# Patient Record
Sex: FEMALE | Race: WHITE | Employment: FULL TIME | URBAN - METROPOLITAN AREA
[De-identification: names, ages, dates, MRNs, and addresses within clinical notes are randomized per-mention and may not be internally consistent; named-entity substitution may affect disease eponyms.]

---

## 2018-08-07 ENCOUNTER — TELEPHONE (OUTPATIENT)
Dept: OBGYN CLINIC | Facility: CLINIC | Age: 43
End: 2018-08-07

## 2018-08-07 ENCOUNTER — OFFICE VISIT (OUTPATIENT)
Dept: OBGYN CLINIC | Facility: CLINIC | Age: 43
End: 2018-08-07
Payer: COMMERCIAL

## 2018-08-07 ENCOUNTER — APPOINTMENT (OUTPATIENT)
Dept: RADIOLOGY | Facility: CLINIC | Age: 43
End: 2018-08-07
Payer: COMMERCIAL

## 2018-08-07 VITALS — WEIGHT: 142 LBS | BODY MASS INDEX: 25.16 KG/M2 | HEIGHT: 63 IN

## 2018-08-07 DIAGNOSIS — M25.561 RIGHT KNEE PAIN, UNSPECIFIED CHRONICITY: Primary | ICD-10-CM

## 2018-08-07 DIAGNOSIS — M25.561 RIGHT KNEE PAIN, UNSPECIFIED CHRONICITY: ICD-10-CM

## 2018-08-07 PROCEDURE — 73562 X-RAY EXAM OF KNEE 3: CPT

## 2018-08-07 PROCEDURE — 99204 OFFICE O/P NEW MOD 45 MIN: CPT | Performed by: ORTHOPAEDIC SURGERY

## 2018-08-07 RX ORDER — NAPROXEN 500 MG/1
500 TABLET ORAL 2 TIMES DAILY WITH MEALS
Qty: 30 TABLET | Refills: 1 | Status: SHIPPED | OUTPATIENT
Start: 2018-08-07 | End: 2018-08-21 | Stop reason: HOSPADM

## 2018-08-07 RX ORDER — ACETAMINOPHEN 325 MG/1
650 TABLET ORAL EVERY 6 HOURS PRN
COMMUNITY

## 2018-08-07 RX ORDER — NAPROXEN AND ESOMEPRAZOLE MAGNESIUM 500; 20 MG/1; MG/1
TABLET, DELAYED RELEASE ORAL AS NEEDED
COMMUNITY

## 2018-08-07 RX ORDER — IBUPROFEN 200 MG
TABLET ORAL EVERY 6 HOURS PRN
COMMUNITY

## 2018-08-07 NOTE — LETTER
August 7, 2018     Patient: Sarah Huerta   YOB: 1975   Date of Visit: 8/7/2018       To Whom it May Concern:    Sarah Huerta is under my professional care  She was seen in my office on 8/7/2018  She should not return to work until 8/13/18 pending the results of her MRI  If you have any questions or concerns, please don't hesitate to call           Sincerely,          Raúl Hurtado MD        CC: No Recipients

## 2018-08-07 NOTE — PROGRESS NOTES
Assessment/Plan:  1  Right knee pain, unspecified chronicity  XR knee 3 vw right non injury    MRI knee right  wo contrast    naproxen (EC NAPROSYN) 500 MG EC tablet       Scribe Attestation    I,:   Mounika Mcnamara am acting as a scribe while in the presence of the attending physician :        I,:   Kristine Rodríguez MD personally performed the services described in this documentation    as scribed in my presence :          I am familiar with Tracy as I previously treated her while practicing in [place] in 2013  She continues to suffer from patellofemoral degenerative changes  I am concerned as her clinical presentation today is not consistent with her previous diagnoses  I reviewed the x-rays of her right knee with her in the office today and explained that her x-ray is consistent with the previously diagnosed patellofemoral degenerative changes  Her x-rays do demonstrate appropriate alignment of the medial compartment  I do not appreciate any acute fractures  At this time, I would like to obtain an MRI of the right knee to question meniscal injury  We will also be able to continue to monitor the arthritic changes in the central compartment of the knee as well  I have provided a prescription for this today in the office  I also provided Tracy with a prescription for naproxen to address the swelling in her knee and to decrease her pain  She will follow up with me after her MRI to review the results  Subjective:   Bernie Spears is a 41 y o  female who presents today for evaluation of right knee pain  She states she began having pain in the knee after a fall in approximately 2010  She has undergone multiple rounds of MRI in 2011, 2013, and 2015 and diagnosed with advancing patellofemoral degenerative changes  Those reports are available for our review today  Tracy reports that she has had success in controlling her symptoms with physical therapy and home exercises   She states that approximately 3 weeks ago she started experiencing an increase in her symptoms  At today's appointment, she complains of constant, moderate to severe, nonradiating aching pain about the medial aspect of the knee  She describes this pain as different from the central compartment pain she is familiar with related to her patellofemoral arthritis  Her symptoms are worse when she drives long distances for work and going up and down stairs  She has been taking over the counter medications including advil and tylenol with only short periods of very mild relief  She denies any recent injury or trauma  She denies any paresthesia  Review of Systems   Constitutional: Negative for chills, fever and unexpected weight change  HENT: Negative for hearing loss, nosebleeds and sore throat  Eyes: Negative for pain, redness and visual disturbance  Respiratory: Negative for cough, shortness of breath and wheezing  Cardiovascular: Negative for chest pain, palpitations and leg swelling  Gastrointestinal: Negative for abdominal pain, nausea and vomiting  Endocrine: Negative for polydipsia and polyuria  Genitourinary: Negative for dysuria and hematuria  Musculoskeletal: Positive for arthralgias, joint swelling and myalgias  See HPI   Skin: Negative for rash and wound  Neurological: Negative for dizziness, numbness and headaches  Psychiatric/Behavioral: Negative for decreased concentration and suicidal ideas  The patient is not nervous/anxious  No past medical history on file  Past Surgical History:   Procedure Laterality Date     SECTION         Family History   Problem Relation Age of Onset   Den Perez Cancer Mother     Cancer Sister        Social History     Occupational History    Not on file       Social History Main Topics    Smoking status: Never Smoker    Smokeless tobacco: Never Used    Alcohol use No    Drug use: No    Sexual activity: Not on file         Current Outpatient Prescriptions:    acetaminophen (TYLENOL) 325 mg tablet, Take 650 mg by mouth every 6 (six) hours as needed for mild pain, Disp: , Rfl:     Glucosamine-Chondroitin (COSAMIN DS PO), Take by mouth, Disp: , Rfl:     ibuprofen (MOTRIN) 200 mg tablet, Take by mouth every 6 (six) hours as needed for mild pain, Disp: , Rfl:     Naproxen-Esomeprazole (VIMOVO) 500-20 MG TBEC, Take by mouth as needed, Disp: , Rfl:     naproxen (EC NAPROSYN) 500 MG EC tablet, Take 1 tablet (500 mg total) by mouth 2 (two) times a day with meals, Disp: 30 tablet, Rfl: 1    No Known Allergies    Objective: There were no vitals filed for this visit  Right Knee Exam     Tenderness   The patient is experiencing tenderness in the medial joint line  Range of Motion   Extension: -10   Flexion: 130     Tests   Varus: negative  Valgus: negative    Other   Erythema: present  Scars: absent  Sensation: normal  Pulse: present  Swelling: moderate    Comments:  Crepitus with flexion and extension            Physical Exam   Constitutional: She is oriented to person, place, and time  She appears well-developed and well-nourished  HENT:   Head: Normocephalic and atraumatic  Eyes: Conjunctivae are normal    Neck: Neck supple  Cardiovascular: Intact distal pulses  Pulmonary/Chest: Effort normal    Neurological: She is alert and oriented to person, place, and time  Skin: Skin is warm and dry  Psychiatric: She has a normal mood and affect  Her behavior is normal    Vitals reviewed  I have personally reviewed pertinent films in PACS and my interpretation is as follows:  X-rays of the knees obtained on 8/7/18 show advanced degenerative changes to the patellofemoral compartment  This is consistent with MRI reports from 2011, 2013, and 2015  Appropriate alignment of the medial compartment is noted  I do not appreciate any acute fractures

## 2018-08-07 NOTE — PROGRESS NOTES
Assessment/Plan:  No diagnosis found  Scribe Attestation    I,:    am acting as a scribe while in the presence of the attending physician :        I,:    personally performed the services described in this documentation    as scribed in my presence :              ***    Subjective:   Lacey Limon is a 41 y o  female who presents ***    No issues until 3 weeks ago  Symptoms have gotten progressively worse  Most notable when driving  Moderate relief from OTC meds but not lasting  Physical therapy 3-4 years ago which helped  Has been doing HEP  Review of Systems      No past medical history on file  Past Surgical History:   Procedure Laterality Date     SECTION         Family History   Problem Relation Age of Onset   [facility] Cancer Mother     Cancer Sister        Social History     Occupational History    Not on file  Social History Main Topics    Smoking status: Never Smoker    Smokeless tobacco: Never Used    Alcohol use No    Drug use: No    Sexual activity: Not on file         Current Outpatient Prescriptions:     acetaminophen (TYLENOL) 325 mg tablet, Take 650 mg by mouth every 6 (six) hours as needed for mild pain, Disp: , Rfl:     Glucosamine-Chondroitin (COSAMIN DS PO), Take by mouth, Disp: , Rfl:     ibuprofen (MOTRIN) 200 mg tablet, Take by mouth every 6 (six) hours as needed for mild pain, Disp: , Rfl:     Naproxen-Esomeprazole (VIMOVO) 500-20 MG TBEC, Take by mouth as needed, Disp: , Rfl:     No Known Allergies    Objective: There were no vitals filed for this visit  Right Knee Exam     Tenderness   The patient is experiencing tenderness in the medial joint line      Range of Motion   Extension: -10   Flexion: 130     Other   Erythema: absent  Scars: absent  Sensation: normal  Pulse: present  Swelling: moderate            Physical Exam    I have personally reviewed pertinent films in PACS and my interpretation is as follows:  ***

## 2018-08-10 ENCOUNTER — TELEPHONE (OUTPATIENT)
Dept: OBGYN CLINIC | Facility: HOSPITAL | Age: 43
End: 2018-08-10

## 2018-08-10 ENCOUNTER — TELEPHONE (OUTPATIENT)
Dept: OBGYN CLINIC | Facility: CLINIC | Age: 43
End: 2018-08-10

## 2018-08-10 DIAGNOSIS — M25.561 RIGHT KNEE PAIN, UNSPECIFIED CHRONICITY: Primary | ICD-10-CM

## 2018-08-10 NOTE — TELEPHONE ENCOUNTER
Spoke with Tracy  Explained that she needs PT before MRI being approved  She is going to go to [facility] in [place]  She will do PT for 6 weeks and then follow up with Dr Mary Cash

## 2018-08-10 NOTE — TELEPHONE ENCOUNTER
Tracy called back  She would like to know if she should return to work at this time  We provided her a note to be out until Monday, pending the results of her MRI  But now that the MRI is denied, she is not sure if she should return to work  If she is to remain out of work, she will need a new note

## 2018-08-13 ENCOUNTER — TELEPHONE (OUTPATIENT)
Dept: OBGYN CLINIC | Facility: CLINIC | Age: 43
End: 2018-08-13

## 2018-08-13 NOTE — TELEPHONE ENCOUNTER
Call from patient   Call back # [phone]  Dr Lemus Dad       Patient is requesting an updated work note, she was supposed to return to work today 08/13/18 after MRI  Patient states MRI was not approved and would like to speak to someone from the office    Thank you

## 2018-08-13 NOTE — TELEPHONE ENCOUNTER
Patient returned to work today  States she will start PT and give us a call to let us know how things are going

## 2018-08-21 ENCOUNTER — OFFICE VISIT (OUTPATIENT)
Dept: OBGYN CLINIC | Facility: CLINIC | Age: 43
End: 2018-08-21
Payer: COMMERCIAL

## 2018-08-21 VITALS
BODY MASS INDEX: 25.3 KG/M2 | DIASTOLIC BLOOD PRESSURE: 78 MMHG | HEART RATE: 69 BPM | SYSTOLIC BLOOD PRESSURE: 114 MMHG | WEIGHT: 142.8 LBS | HEIGHT: 63 IN

## 2018-08-21 DIAGNOSIS — M25.561 RIGHT KNEE PAIN, UNSPECIFIED CHRONICITY: Primary | ICD-10-CM

## 2018-08-21 PROCEDURE — 99213 OFFICE O/P EST LOW 20 MIN: CPT | Performed by: ORTHOPAEDIC SURGERY

## 2018-08-21 NOTE — LETTER
August 21, 2018     Patient: Shu Interiano   YOB: 1975   Date of Visit: 8/21/2018       To Whom it May Concern:    Shu Interiano is under my professional care  She was seen in my office on 8/21/2018  She is to be out of work until further notice  If you have any questions or concerns, please don't hesitate to call           Sincerely,          Bean Davies MD

## 2018-08-21 NOTE — PROGRESS NOTES
Assessment/Plan:  1  Right knee pain, unspecified chronicity  MRI knee right  wo contrast       Scribe Attestation    I,:   Beatrice Hernandez MA am acting as a scribe while in the presence of the attending physician :        I,:   Lars Mathew MD personally performed the services described in this documentation    as scribed in my presence :              I discussed with Tracy that we will order an MRI today questioning a medial mensicus tear  I feel as though her examination in the office today is consistent with a medial meniscus tear  We did discuss she may require surgical intervention if a medial mensicus tear is present  We will keep her out of work until further notice and a note was provided for her today stating this  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  She will follow up with us after her MRI to discuss the results  Subjective:   Maricruz Vora is a 41 y o  female who presents to the office today for her right knee pain  At her last visit an MRI was ordered questioning a medial mensicus tear but was denied by her insurance  Since her last visit she has tried 5 sessions of physical therapy which she states is making her knee pain worse  The patient states since her last visit her knee pain is progressively getting worse  She describes a sharp pain that is constant while at rest and with activities  Driving and stairs increase her knee pain  She does describe occasional giving out and constant clicking and popping  She does note intermediate swelling  Review of Systems   Constitutional: Negative for chills and fever  HENT: Negative for drooling and sneezing  Eyes: Negative for redness  Respiratory: Negative for cough and wheezing  Gastrointestinal: Negative for nausea and vomiting  Musculoskeletal: Positive for arthralgias, joint swelling and myalgias  Neurological: Negative for weakness and numbness     Psychiatric/Behavioral: Negative for behavioral problems  The patient is not nervous/anxious  History reviewed  No pertinent past medical history  Past Surgical History:   Procedure Laterality Date     SECTION         Family History   Problem Relation Age of Onset   Hakeem Whitman Cancer Mother     Cancer Sister        Social History     Occupational History    Not on file  Social History Main Topics    Smoking status: Never Smoker    Smokeless tobacco: Never Used    Alcohol use No    Drug use: No    Sexual activity: Not on file         Current Outpatient Prescriptions:     acetaminophen (TYLENOL) 325 mg tablet, Take 650 mg by mouth every 6 (six) hours as needed for mild pain, Disp: , Rfl:     Glucosamine-Chondroitin (COSAMIN DS PO), Take by mouth, Disp: , Rfl:     ibuprofen (MOTRIN) 200 mg tablet, Take by mouth every 6 (six) hours as needed for mild pain, Disp: , Rfl:     Naproxen-Esomeprazole (VIMOVO) 500-20 MG TBEC, Take by mouth as needed, Disp: , Rfl:     No Known Allergies    Objective:  Vitals:    18 0843   BP: 114/78   Pulse: 69       Right Knee Exam     Tenderness   The patient is experiencing tenderness in the medial joint line  Range of Motion   Extension: 0   Flexion: 110     Tests   Fidel:  Medial - positive     Other   Sensation: normal    Comments:  No lateral joint line tenderness  Stable to varus and valgus stress  Stable anterior and posterior drawer            Physical Exam   Constitutional: She is oriented to person, place, and time  She appears well-developed and well-nourished  HENT:   Head: Atraumatic  Eyes: Conjunctivae are normal    Neck: Normal range of motion  Cardiovascular: Normal rate  Pulmonary/Chest: Effort normal    Musculoskeletal:   As noted in HPI   Neurological: She is alert and oriented to person, place, and time  Skin: Skin is warm and dry  Psychiatric: She has a normal mood and affect   Her behavior is normal  Judgment and thought content normal

## 2018-08-28 ENCOUNTER — TELEPHONE (OUTPATIENT)
Dept: OBGYN CLINIC | Facility: HOSPITAL | Age: 43
End: 2018-08-28

## 2018-08-28 NOTE — TELEPHONE ENCOUNTER
Patient sees Dr Patrice Aburto  She states that she was supposed to get a call back yesterday regarding the authorization for her MRI  She is asking for a call back

## 2018-08-30 ENCOUNTER — HOSPITAL ENCOUNTER (OUTPATIENT)
Dept: RADIOLOGY | Facility: HOSPITAL | Age: 43
Discharge: HOME | End: 2018-08-30
Attending: ORTHOPAEDIC SURGERY
Payer: COMMERCIAL

## 2018-08-30 DIAGNOSIS — M25.561 RIGHT KNEE PAIN, UNSPECIFIED CHRONICITY: ICD-10-CM

## 2018-08-30 PROCEDURE — 73721 MRI JNT OF LWR EXTRE W/O DYE: CPT

## 2018-09-04 ENCOUNTER — TELEPHONE (OUTPATIENT)
Dept: OBGYN CLINIC | Facility: CLINIC | Age: 43
End: 2018-09-04

## 2018-09-04 NOTE — TELEPHONE ENCOUNTER
I called patient and advised  She will come in at her scheduled appointment since she has more questions advised she can obtain a PT script at that time  Tracy also wanted a copy of the Mri report, I advised I will put a copy up front with her paperwork for

## 2018-09-04 NOTE — TELEPHONE ENCOUNTER
The MRI demonstrated patellar tendinitis as well as some mild arthritic change in the patella  No signs of meniscal tear or ligamentous tear  He could certainly do physical therapy for this if he would like

## 2018-09-11 ENCOUNTER — OFFICE VISIT (OUTPATIENT)
Dept: OBGYN CLINIC | Facility: CLINIC | Age: 43
End: 2018-09-11
Payer: COMMERCIAL

## 2018-09-11 VITALS — WEIGHT: 140 LBS | HEIGHT: 63 IN | BODY MASS INDEX: 24.8 KG/M2

## 2018-09-11 DIAGNOSIS — M94.261 CHONDROMALACIA OF KNEE, RIGHT: Primary | ICD-10-CM

## 2018-09-11 DIAGNOSIS — M76.51 PATELLAR TENDINITIS OF RIGHT KNEE: ICD-10-CM

## 2018-09-11 PROCEDURE — 99214 OFFICE O/P EST MOD 30 MIN: CPT | Performed by: ORTHOPAEDIC SURGERY

## 2018-09-11 NOTE — PROGRESS NOTES
Assessment/Plan:  1  Chondromalacia of knee, right  Ambulatory referral to Physical Therapy   2  Patellar tendinitis of right knee  Ambulatory referral to Physical Therapy       Scribe Attestation    I,:   Cally Chang am acting as a scribe while in the presence of the attending physician :        I,:   Peggy Ambrose MD personally performed the services described in this documentation    as scribed in my presence :              Tracy upon examination review of MRI today demonstrates signs and symptoms consistent with right knee chondromalacia of the patella as well as patellar tendinitis  She does demonstrate a stable knee on examination today review of MRI does not demonstrate a meniscus tear  I noted to her that the discomfort while walking and getting up from a seated position is likely due to the chondromalacia and arthritic changes behind the patella  I did note to her that I would like her to continue movement of the knee in may continue with activities as tolerated  I would also like her to participate in physical therapy  I provided prescription for this today with specific instructions to refrain from open kinetic chain exercises only use closed kinetic chain exercises  Additionally I discussed other means of treatment should she fail to have improvement of symptoms after physical therapy such as cortisone injection as well as joint lubrication injections such as Euflexxa  I did note that the arthritis behind the patella will progress resulting to her having pain discomfort in the anterior knee  The only permanent solution would be total knee replacement  However she is not a good candidate for this as she is too young and her knee is not in a condition that would require knee replacement  I will see Estrellita galvan in 2 weeks for repeat evaluation prior to returning her to work  Subjective:   Lacey Limon is a 41 y o  female who presents for follow-up evaluation of her right knee   She has been experiencing this anterior medial knee pain since the end of July  She denies any trauma to her knee  She states that her pain is exacerbated with prolonged weight-bearing activities  It is better at rest   However does feel a mild to moderate sharp pain  She has previously has participated in physical therapy and notes no improvement  She has not done anything formal physical therapy in the last 2 weeks she has been waiting for the MRI results  Today she denies any distal paresthesias  Review of Systems   Constitutional: Negative for chills, fever and unexpected weight change  HENT: Negative for hearing loss, nosebleeds and sore throat  Eyes: Negative for pain, redness and visual disturbance  Respiratory: Negative for cough, shortness of breath and wheezing  Cardiovascular: Negative for chest pain, palpitations and leg swelling  Gastrointestinal: Negative for abdominal pain, nausea and vomiting  Endocrine: Negative for polydipsia and polyuria  Genitourinary: Negative for dysuria and hematuria  Musculoskeletal: Positive for myalgias  See HPI   Skin: Negative for rash and wound  Neurological: Negative for dizziness, numbness and headaches  Psychiatric/Behavioral: Negative for decreased concentration and suicidal ideas  The patient is not nervous/anxious  History reviewed  No pertinent past medical history  Past Surgical History:   Procedure Laterality Date     SECTION         Family History   Problem Relation Age of Onset   Isabella Roles Cancer Mother     Cancer Sister        Social History     Occupational History    Not on file       Social History Main Topics    Smoking status: Never Smoker    Smokeless tobacco: Never Used    Alcohol use No    Drug use: No    Sexual activity: Not on file         Current Outpatient Prescriptions:     acetaminophen (TYLENOL) 325 mg tablet, Take 650 mg by mouth every 6 (six) hours as needed for mild pain, Disp: , Rfl:    Glucosamine-Chondroitin (COSAMIN DS PO), Take by mouth, Disp: , Rfl:     ibuprofen (MOTRIN) 200 mg tablet, Take by mouth every 6 (six) hours as needed for mild pain, Disp: , Rfl:     Naproxen-Esomeprazole (VIMOVO) 500-20 MG TBEC, Take by mouth as needed, Disp: , Rfl:     No Known Allergies    Objective: There were no vitals filed for this visit  Right Knee Exam     Tenderness   The patient is experiencing tenderness in the medial hamstring  Range of Motion   Extension: -5 (Crepitus through range of motion)   Flexion: 110     Tests   Drawer:       Anterior - negative    Posterior - negative  Varus: negative  Valgus: negative    Other   Erythema: absent  Scars: present  Sensation: normal  Pulse: present  Swelling: mild    Comments:  Laxity to varus stress however good endpoint  Knee flexion:  4/5 positive for discomfort the medial hamstring  Knee extension:  4/5 negative for discomfort            Physical Exam   Constitutional: She is oriented to person, place, and time  She appears well-developed and well-nourished  HENT:   Head: Normocephalic and atraumatic  Eyes: Conjunctivae are normal    Neck: Normal range of motion  Cardiovascular: Normal rate  Pulmonary/Chest: Effort normal    Musculoskeletal:   As noted in HPI   Neurological: She is alert and oriented to person, place, and time  Skin: Skin is warm and dry  Psychiatric: She has a normal mood and affect  Her behavior is normal  Judgment and thought content normal    Nursing note and vitals reviewed  I have personally reviewed pertinent films in PACS and my interpretation is as follows:    MRI of the right knee demonstrates moderate patellofemoral chondromalacia, mild patellar tendinosis with no high-grade tear as well as suprapatellar fat edema no meniscus tear is demonstrated

## 2018-10-02 ENCOUNTER — TELEPHONE (OUTPATIENT)
Dept: OBGYN CLINIC | Facility: CLINIC | Age: 43
End: 2018-10-02

## 2018-10-02 NOTE — TELEPHONE ENCOUNTER
Patient Called in sandeep of her short term disability form  She say she's needs her monthly renewal sent in  I informed her that you'll be in touch as soon as you can

## 2018-11-14 ENCOUNTER — OFFICE VISIT (OUTPATIENT)
Dept: OBGYN CLINIC | Facility: CLINIC | Age: 43
End: 2018-11-14
Payer: COMMERCIAL

## 2018-11-14 VITALS
WEIGHT: 143.6 LBS | HEART RATE: 68 BPM | DIASTOLIC BLOOD PRESSURE: 80 MMHG | SYSTOLIC BLOOD PRESSURE: 136 MMHG | HEIGHT: 63 IN | BODY MASS INDEX: 25.45 KG/M2

## 2018-11-14 DIAGNOSIS — M94.261 CHONDROMALACIA OF KNEE, RIGHT: Primary | ICD-10-CM

## 2018-11-14 DIAGNOSIS — M76.51 PATELLAR TENDINITIS OF RIGHT KNEE: ICD-10-CM

## 2018-11-14 PROCEDURE — 99214 OFFICE O/P EST MOD 30 MIN: CPT | Performed by: ORTHOPAEDIC SURGERY

## 2018-11-14 NOTE — LETTER
November 14, 2018     Patient: Lakisha Fischer   YOB: 1975   Date of Visit: 11/14/2018       To Whom it May Concern:    Lakisha Fischer is under my professional care  She was seen in my office on 11/14/2018  She may return to work on 11/19/2018 but should not perform any field visits until 12/05/2018  If you have any questions or concerns, please don't hesitate to call           Sincerely,          Naomi Mondragon MD        CC: No Recipients

## 2018-11-14 NOTE — PROGRESS NOTES
Assessment/Plan:  1  Chondromalacia of knee, right     2  Patellar tendinitis of right knee         Scribe Attestation    I,:   Sarah Juarez am acting as a scribe while in the presence of the attending physician :        I,:   Corey Gutierrez MD personally performed the services described in this documentation    as scribed in my presence :          Bo Miramontes appears to be having a flare-up of the chondromalacia and osteoarthritis in the patellofemoral joint  We did review her previous MRI today in the office and I assured her that the ligamentous and soft tissue structures of her knee are intact  I did explain to her that her arthritis will continue to progress although the level of activity at her job should not affect his progression however her increased activity at work will cause increases in pain  I also explained that she should expect to experience flare-ups like this in the future, however hopefully they occur less frequently  She understands that corticosteroid and viscosupplementation injections are an option for her in the future if she wants to pursue them  I do believe is appropriate for her to be discharged from formal therapy and continue with home exercises  I also advised her to modify her work activity as much as possible and to use ice at the end of the day for pain  I did provide her with a note for her employer excusing her from work until 11/19/2018 and restricting her from performing site visits until 12/05/2018  She will follow up with me on an as-needed basis  Subjective:   Pablo Jon is a 41 y o  female who presents today for follow-up evaluation for right knee chondromalacia and patellar tendinitis  She return to work on 10/08/2018 and at that time she states that she was pain free at rest and with activity  She was able to work for the last month without issue    She states that on 11/05/2018 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 and her pain and swelling returned  She has continued working with a physical therapist however he has recommended that she be discharged to home exercise program   At today's visit, she describes the moderate sharp pain behind her kneecap that can radiate to the medial aspect of her knee  Her pain is made worse with any activity, especially going downstairs  She is considering potentially changing jobs as she does not believe her job responsibilities will change  She denies any acute injury or trauma  She denies any distal paresthesias of the lower extremity  Review of Systems   Constitutional: Negative for chills, fever and unexpected weight change  HENT: Negative for hearing loss, nosebleeds and sore throat  Eyes: Negative for pain, redness and visual disturbance  Respiratory: Negative for cough, shortness of breath and wheezing  Cardiovascular: Negative for chest pain, palpitations and leg swelling  Gastrointestinal: Negative for abdominal pain, nausea and vomiting  Endocrine: Negative for polydipsia and polyuria  Genitourinary: Negative for dysuria and hematuria  Musculoskeletal: Positive for arthralgias, joint swelling and myalgias  See HPI   Skin: Negative for rash and wound  Neurological: Negative for dizziness, numbness and headaches  Psychiatric/Behavioral: Negative for decreased concentration and suicidal ideas  The patient is not nervous/anxious  History reviewed  No pertinent past medical history  Past Surgical History:   Procedure Laterality Date     SECTION         Family History   Problem Relation Age of Onset   Alfredo Kahn Cancer Mother     Cancer Sister        Social History     Occupational History    Not on file       Social History Main Topics    Smoking status: Never Smoker    Smokeless tobacco: Never Used    Alcohol use No    Drug use: No    Sexual activity: Not on file         Current Outpatient Prescriptions:     Glucosamine-Chondroitin Reyna Jeronimo DS PO), Take by mouth, Disp: , Rfl:     ibuprofen (MOTRIN) 200 mg tablet, Take by mouth every 6 (six) hours as needed for mild pain, Disp: , Rfl:     Naproxen-Esomeprazole (VIMOVO) 500-20 MG TBEC, Take by mouth as needed, Disp: , Rfl:     acetaminophen (TYLENOL) 325 mg tablet, Take 650 mg by mouth every 6 (six) hours as needed for mild pain, Disp: , Rfl:     No Known Allergies    Objective:  Vitals:    11/14/18 0926   BP: 136/80   Pulse: 68       Right Knee Exam     Tenderness   The patient is experiencing tenderness in the medial joint line  Range of Motion   Extension: -5   Flexion: 110     Tests   Lachman:  Anterior - negative    Posterior - negative  Varus: negative  Valgus: negative    Other   Erythema: absent  Scars: absent  Sensation: normal  Pulse: present  Swelling: mild  Other tests: effusion (1+) present    Comments:    Crepitus with range of motion          Observations     Right Knee   Positive for effusion (1+)  Physical Exam   Constitutional: She is oriented to person, place, and time  She appears well-developed and well-nourished  HENT:   Head: Normocephalic and atraumatic  Eyes: Conjunctivae are normal    Neck: Neck supple  Cardiovascular: Intact distal pulses  Pulmonary/Chest: Effort normal    Musculoskeletal:        Right knee: She exhibits effusion (1+)  Neurological: She is alert and oriented to person, place, and time  Skin: Skin is warm and dry  Psychiatric: She has a normal mood and affect  Her behavior is normal    Vitals reviewed  I have personally reviewed pertinent films in PACS and my interpretation is as follows:  MRI of the right knee demonstrates no obvious tear of the meniscus or of the cruciate or collateral ligaments  There is certainly chondromalacia in the patella  Dexter Ally